# Patient Record
Sex: MALE | Employment: FULL TIME | URBAN - METROPOLITAN AREA
[De-identification: names, ages, dates, MRNs, and addresses within clinical notes are randomized per-mention and may not be internally consistent; named-entity substitution may affect disease eponyms.]

---

## 2020-10-06 ENCOUNTER — APPOINTMENT (OUTPATIENT)
Dept: LAB | Facility: HOSPITAL | Age: 45
End: 2020-10-06
Attending: INTERNAL MEDICINE
Payer: COMMERCIAL

## 2020-10-06 DIAGNOSIS — R94.39 ABNORMAL STRESS TEST: ICD-10-CM

## 2020-10-09 ENCOUNTER — HOSPITAL ENCOUNTER (OUTPATIENT)
Dept: INTERVENTIONAL RADIOLOGY/VASCULAR | Facility: HOSPITAL | Age: 45
Discharge: HOME | End: 2020-10-09
Attending: INTERNAL MEDICINE | Admitting: INTERNAL MEDICINE
Payer: COMMERCIAL

## 2020-10-09 VITALS
DIASTOLIC BLOOD PRESSURE: 67 MMHG | BODY MASS INDEX: 34.95 KG/M2 | WEIGHT: 258 LBS | RESPIRATION RATE: 19 BRPM | OXYGEN SATURATION: 99 % | HEIGHT: 72 IN | HEART RATE: 52 BPM | SYSTOLIC BLOOD PRESSURE: 109 MMHG

## 2020-10-09 DIAGNOSIS — R94.39 ABNORMAL STRESS TEST: Primary | ICD-10-CM

## 2020-10-09 PROCEDURE — 4A023N7 MEASUREMENT OF CARDIAC SAMPLING AND PRESSURE, LEFT HEART, PERCUTANEOUS APPROACH: ICD-10-PCS | Performed by: INTERNAL MEDICINE

## 2020-10-09 PROCEDURE — 99153 MOD SED SAME PHYS/QHP EA: CPT

## 2020-10-09 PROCEDURE — 99152 MOD SED SAME PHYS/QHP 5/>YRS: CPT

## 2020-10-09 PROCEDURE — B200YZZ PLAIN RADIOGRAPHY OF SINGLE CORONARY ARTERY USING OTHER CONTRAST: ICD-10-PCS | Performed by: INTERNAL MEDICINE

## 2020-10-09 PROCEDURE — 93458 L HRT ARTERY/VENTRICLE ANGIO: CPT

## 2020-10-09 PROCEDURE — B2011ZZ PLAIN RADIOGRAPHY OF MULTIPLE CORONARY ARTERIES USING LOW OSMOLAR CONTRAST: ICD-10-PCS | Performed by: INTERNAL MEDICINE

## 2020-10-09 RX ORDER — NITROGLYCERIN 20 MG/100ML
INJECTION INTRAVENOUS
Status: COMPLETED
Start: 2020-10-09 | End: 2020-10-09

## 2020-10-09 RX ORDER — NICARDIPINE HYDROCHLORIDE 2.5 MG/ML
INJECTION INTRAVENOUS
Status: COMPLETED
Start: 2020-10-09 | End: 2020-10-09

## 2020-10-09 RX ORDER — HEPARIN SODIUM 5000 [USP'U]/ML
INJECTION, SOLUTION INTRAVENOUS; SUBCUTANEOUS
Status: COMPLETED
Start: 2020-10-09 | End: 2020-10-09

## 2020-10-09 RX ORDER — SODIUM CHLORIDE 9 MG/ML
INJECTION, SOLUTION INTRAVENOUS
Status: DISCONTINUED | OUTPATIENT
Start: 2020-10-10 | End: 2020-10-09 | Stop reason: HOSPADM

## 2020-10-09 RX ORDER — VERAPAMIL HYDROCHLORIDE 2.5 MG/ML
INJECTION, SOLUTION INTRAVENOUS
Status: COMPLETED
Start: 2020-10-09 | End: 2020-10-09

## 2020-10-09 RX ORDER — MIDAZOLAM HYDROCHLORIDE 1 MG/ML
INJECTION INTRAMUSCULAR; INTRAVENOUS
Status: COMPLETED
Start: 2020-10-09 | End: 2020-10-09

## 2020-10-09 RX ORDER — LIDOCAINE HYDROCHLORIDE 10 MG/ML
INJECTION, SOLUTION EPIDURAL; INFILTRATION; INTRACAUDAL; PERINEURAL
Status: COMPLETED
Start: 2020-10-09 | End: 2020-10-09

## 2020-10-09 NOTE — PROGRESS NOTES
Patient had LHC with Dr. Kalyani Lutz today. Right wrist with TR band in place with 11 cc air instilled. Site CDI, no hematoma, right hand has good O2 pleth on monitor. Patient denies any numbness or tingling to right hand or fingers. VSS.   Dr. Kalyani Lutz @ bedside post p

## 2020-10-09 NOTE — H&P
Patient seen and examined independently. H and P dated 9/22/20 reviewed. No changes in H and P. Risks and benefits of procedure were discussed with patient. Airway examined. Patient is ASA class 2 and mallampati class 2.  Pt is appropriate for conscious se

## 2020-10-09 NOTE — PROCEDURES
[id number] [facility] Location: Cath Lab    CSN [id number] MRN [id number]   Admission Date 10/9/2020 Procedure Date 10/9/2020   Attending Physician Sammy Armendariz MD Procedure Physician Mati Castillo, [address] [place] [address] stenosis upon pullback of the catheter. 2.  Selective coronary angiography:  right dominant system, very sluggish TIMI2 flow, consistent with endovascular dysfunction. Improved with intracoronary nicardipine.     Left main artery: The left main artery i

## (undated) NOTE — LETTER
[facility] [address], [address]  Consent for Procedure/Sedation    Date:     Time:       1.  I authorize the performance upon Rigoberto Givens the following:cardiac catheterization, left ventricular cineangiography, bilateral tatiana Signature of person authorized                                     Relationship to  to consent for patient: _________________________ patient: ___________________    Witness: _______________________________ Date: _____________________    Printed: 10/7/2020